# Patient Record
(demographics unavailable — no encounter records)

---

## 2025-02-05 NOTE — PHYSICAL EXAM
[Normal Breath Sounds] : Normal breath sounds [Normal Heart Sounds] : normal heart sounds [Alert] : alert [Calm] : calm [de-identified] :  in no acute distress. Well- developed, well-nourished. [de-identified] : normocephalic  [de-identified] : supple  [de-identified] : deferred [de-identified] : soft, non-distended, non-tender, no rebound or guarding.  [de-identified] : ?? +bulging, reducible hernia on right/ left/ mid abdomen/ inguinal region. [de-identified] : deferred [de-identified] : normal range of motions [de-identified] : warm, dry, intact.

## 2025-02-05 NOTE — PLAN
[FreeTextEntry1] : Right/left/bilateral groin/abdominal/incisional/umbilical hernia, symptomatic  - F/u standard pre-op labs and EKG  -Obtain PCP clearance  -Obtain CT A/P w & w/p contrast, needs disc  - Plan for robotic assisted, possible open, ___  inguinal /abdominal / ventral / incisional / umbilical hernia repair  - Go to ED if pain is resistant to pain medication or if symptoms worsen (N/V/fever, chills)

## 2025-02-05 NOTE — ASSESSMENT
[FreeTextEntry1] : This is a 54 y/o Female with a PMHx of ....... and PSHx of ........presents today for initial evaluation of ventral hernia.  Patient reports worsening of discomfort over the last.......weeks/month in abdomen exacerbated by coughing, straining, sneezing, lifting. Patient reports expansion of bulging/swelling that is reducible and associated with intermittent non-radiating pain. Relief with self-reduction/low activity/reclining. Denies nausea, vomiting, fever, chills, pain out of proportion, chest pain, HA, dizziness. Denies constipation, difficulty with bowel movements or urination. Denies shortness of breath, GROVE, or difficulty breathing, patient can walk / climb 2 flights without stopping. Denies hx of blood clots or bleeding problems.   Imaging: CT A/P  ( / / )w/ w/o contrast:   Exam significant for:  +bulging, reducible hernia on (?quadruant)  abdomen region.     The nature and risks of hernia were discussed as were signs and symptoms of incarceration, obstruction and strangulation as possible risks of observation. A thorough preoperative education has been performed about the role and the different surgical options have been discussed with patient. Risks, benefits and alternatives have been discussed and patient verbalizes understanding. Laparoscopic/Robotic/Open repair of inguinal hernia/abdominal hernia was discussed with the patient at length. The risks, benefits, and alternatives of the types of repair as well as to the use of mesh were discussed and all questions answered. Risks of hernia repair include, but are not limited to infection, bleeding, recurrence and injury to adjacent structures and nerves. Advised patient about possible risk of future complications if hernia worsens or goes untreated. Patient would like to proceed with RA ventral repair with mesh.

## 2025-02-05 NOTE — HISTORY OF PRESENT ILLNESS
[de-identified] : This is a 52 y/o Female with a PMHx of ....... and PSHx of ........presents today for initial evaluation of ventral hernia.  Patient reports worsening of discomfort over the last.......weeks/month in abdomen exacerbated by coughing, straining, sneezing, lifting. Patient reports expansion of bulging/swelling that is reducible and associated with intermittent non-radiating pain. Relief with self-reduction/low activity/reclining. Denies nausea, vomiting, fever, chills, pain out of proportion, chest pain, HA, dizziness. Denies constipation, difficulty with bowel movements or urination. Denies shortness of breath, GROVE, or difficulty breathing, patient can walk / climb 2 flights without stopping. Denies hx of blood clots or bleeding problems.   Imaging: CT A/P  ( / / )w/ w/o contrast:

## 2025-02-05 NOTE — ADDENDUM
[FreeTextEntry1] :  I, HE ONESIMO, am scribing for and in the presence of Dr. Downey for the following sections: HISTORY OF PRESENT ILLNESS, PAST MEDICAL HISTORY, PAST SURGICAL HISTORY, FAMILY/SOCIAL HISTORY, REVIEW OF SYSTEMS, VITAL SIGNS, PHYSICAL EXAM, DISPOSITION.Reviewed current treatment plan, including medications / surgical intervention / lifestyle modifications where indicated. Reviewed imaging, laboratory results, or other diagnostics as applicable. Addressed patient concerns, including potential complications, risk factors, or alternative treatment options as applicable. Provided detailed education on pre- or post-operative instructions,and expected outcome as applicable. Instructed pt to call office for questions or concerns. Instructed patient to schedule follow-up appointment as recommended. Referrals and testing ordered where indicated. Plan of care reviewed with patient, and questions answered. The total time spent with patient included more than 50% of the time dedicated to counseling and coordination of care.

## 2025-03-12 NOTE — ASSESSMENT
[FreeTextEntry1] : This is a 52 y/o Female with a PMHx of....... and PSHx of........presents today for initial evaluation of ventral hernia.  Patient reports worsening of discomfort over the last.......weeks/month in abdomen exacerbated by coughing, straining, sneezing, lifting. Patient reports expansion of bulging/swelling that is reducible and associated with intermittent non-radiating pain. Relief with self-reduction/low activity/reclining. Denies nausea, vomiting, fever, chills, pain out of proportion, chest pain, HA, dizziness. Denies constipation, difficulty with bowel movements or urination. Denies shortness of breath, GROVE, or difficulty breathing, patient can walk / climb 2 flights without stopping. Denies hx of blood clots or bleeding problems.  Imaging: CT A/P ( / / )w/ w/o contrast:  Exam significant for:  +bulging, reducible hernia on (?quadruant) abdomen region.   The nature and risks of hernia were discussed as were signs and symptoms of incarceration, obstruction and strangulation as possible risks of observation. A thorough preoperative education has been performed about the role and the different surgical options have been discussed with patient. Risks, benefits and alternatives have been discussed and patient verbalizes understanding. Laparoscopic/Robotic/Open repair of inguinal hernia/abdominal hernia was discussed with the patient at length. The risks, benefits, and alternatives of the types of repair as well as to the use of mesh were discussed and all questions answered. Risks of hernia repair include, but are not limited to infection, bleeding, recurrence and injury to adjacent structures and nerves. Advised patient about possible risk of future complications if hernia worsens or goes untreated. Patient would like to proceed with RA ventral repair with mesh.

## 2025-03-12 NOTE — PLAN
[FreeTextEntry1] : Right/left/bilateral groin/abdominal/incisional/umbilical hernia, symptomatic  - F/u standard pre-op labs and EKG  -Obtain PCP clearance  -Obtain CT A/P w & w/p contrast, needs disc  - Plan for robotic assisted, possible open, ___ inguinal /abdominal / ventral / incisional / umbilical hernia repair  - Go to ED if pain is resistant to pain medication or if symptoms worsen (N/V/fever, chills).

## 2025-03-12 NOTE — PHYSICAL EXAM
[Normal Breath Sounds] : Normal breath sounds [Normal Heart Sounds] : normal heart sounds [Alert] : alert [Calm] : calm [de-identified] :  in no acute distress. Well- developed, well-nourished. [de-identified] : normocephalic  [de-identified] : supple  [de-identified] : deferred [de-identified] : soft, non-distended, non-tender, no rebound or guarding.  [de-identified] : ?? +bulging, reducible hernia on right/ left/ mid abdomen/ inguinal region. [de-identified] : deferred [de-identified] : normal range of motions [de-identified] : warm, dry, intact.

## 2025-03-19 NOTE — ASSESSMENT
[FreeTextEntry1] : This is a 52 y/o Female presents today for 3 months f/u s/p robotic eTEP primary ventral hernia repair on 11/14/24 under [facility].   Pt with PMHx of GERD, HLD  PSHx of lap band (2008), lap band removal (2018), abdominoplasty (2008), C/S (2002 and 2005), uterine ablation (2007).  Post-operatively, the patient is doing well. Pain is well controlled and has been improving. Endorses mild abdominal discomfort with standing and moving, does not require any pain meds. Patient is tolerating regular diet w/o nausea or vomiting. Denies any difficulty with bowel movements/constipation or urinating. Offers no other acute complaints at this time. Denies nausea, vomiting, chest pain, SOB, GROVE, calf pain, HA, dizziness, fever or chills since surgery.  Assessment -Exam shows incisions are healing well. No tenderness. No evidence of recurrent hernia or seroma. - No tenderness, rebound or guarding - No signs of infection.

## 2025-03-19 NOTE — PHYSICAL EXAM
[Normal Breath Sounds] : Normal breath sounds [Normal Heart Sounds] : normal heart sounds [Alert] : alert [Calm] : calm [de-identified] :  in no acute distress. Well- developed, well-nourished. [de-identified] : normocephalic  [de-identified] : supple  [de-identified] : deferred [de-identified] : soft, non-distended, non-tender, no rebound or guarding. Incisions - healing well, clean dry intact, no evidence of drainage, purulence, surrounding erythema/ seroma/ hematoma/ bleeding. No recurrent of hernia with valsalva changes. [de-identified] : deferred [de-identified] : deferred [de-identified] : normal range of motions [de-identified] : warm, dry, intact.

## 2025-03-19 NOTE — HISTORY OF PRESENT ILLNESS
[de-identified] : This is a 54 y/o Female presents today for 3 months f/u s/p robotic eTEP primary ventral hernia repair on 11/14/24 under [facility].   Pt with PMHx of GERD, HLD  PSHx of lap band (2008), lap band removal (2018), abdominoplasty (2008), C/S (2002 and 2005), uterine ablation (2007).  Post-operatively, the patient is doing well. Pain is well controlled and has been improving. Endorses mild abdominal discomfort with standing and moving, does not require any pain meds. Patient is tolerating regular diet w/o nausea or vomiting. Denies any difficulty with bowel movements/constipation or urinating. Offers no other acute complaints at this time. Denies nausea, vomiting, chest pain, SOB, GROVE, calf pain, HA, dizziness, fever or chills since surgery.

## 2025-03-19 NOTE — PHYSICAL EXAM
[Normal Breath Sounds] : Normal breath sounds [Normal Heart Sounds] : normal heart sounds [Alert] : alert [Calm] : calm [de-identified] :  in no acute distress. Well- developed, well-nourished. [de-identified] : normocephalic  [de-identified] : supple  [de-identified] : deferred [de-identified] : soft, non-distended, non-tender, no rebound or guarding. Incisions - healing well, clean dry intact, no evidence of drainage, purulence, surrounding erythema/ seroma/ hematoma/ bleeding. No recurrent of hernia with valsalva changes. [de-identified] : deferred [de-identified] : deferred [de-identified] : normal range of motions [de-identified] : warm, dry, intact.

## 2025-03-19 NOTE — PLAN
[FreeTextEntry1] : S/p robotic assisted primary ventral hernia repair on 11/24: - On post-op check patient is doing well and incisions are healing properly. No signs of post-operative complications at this time. - No recurrences of ventral hernia.  - Patient can continue to follow up outpatient in 1 year should they have any worsening of symptoms, pain, or complications

## 2025-03-19 NOTE — REASON FOR VISIT
[Initial Evaluation] : an initial evaluation [FreeTextEntry1] : s/p robotic primary ventral hernias on 11/24

## 2025-03-19 NOTE — PHYSICAL EXAM
[Normal Breath Sounds] : Normal breath sounds [Normal Heart Sounds] : normal heart sounds [Alert] : alert [Calm] : calm [de-identified] :  in no acute distress. Well- developed, well-nourished. [de-identified] : normocephalic  [de-identified] : supple  [de-identified] : deferred [de-identified] : soft, non-distended, non-tender, no rebound or guarding. Incisions - healing well, clean dry intact, no evidence of drainage, purulence, surrounding erythema/ seroma/ hematoma/ bleeding. No recurrent of hernia with valsalva changes. [de-identified] : deferred [de-identified] : deferred [de-identified] : normal range of motions [de-identified] : warm, dry, intact.

## 2025-03-19 NOTE — ASSESSMENT
[FreeTextEntry1] : This is a 54 y/o Female presents today for 3 months f/u s/p robotic eTEP primary ventral hernia repair on 11/14/24 under [facility].   Pt with PMHx of GERD, HLD  PSHx of lap band (2008), lap band removal (2018), abdominoplasty (2008), C/S (2002 and 2005), uterine ablation (2007).  Post-operatively, the patient is doing well. Pain is well controlled and has been improving. Endorses mild abdominal discomfort with standing and moving, does not require any pain meds. Patient is tolerating regular diet w/o nausea or vomiting. Denies any difficulty with bowel movements/constipation or urinating. Offers no other acute complaints at this time. Denies nausea, vomiting, chest pain, SOB, GROVE, calf pain, HA, dizziness, fever or chills since surgery.  Assessment -Exam shows incisions are healing well. No tenderness. No evidence of recurrent hernia or seroma. - No tenderness, rebound or guarding - No signs of infection.

## 2025-03-19 NOTE — HISTORY OF PRESENT ILLNESS
[de-identified] : This is a 54 y/o Female presents today for 3 months f/u s/p robotic eTEP primary ventral hernia repair on 11/14/24 under [facility].   Pt with PMHx of GERD, HLD  PSHx of lap band (2008), lap band removal (2018), abdominoplasty (2008), C/S (2002 and 2005), uterine ablation (2007).  Post-operatively, the patient is doing well. Pain is well controlled and has been improving. Endorses mild abdominal discomfort with standing and moving, does not require any pain meds. Patient is tolerating regular diet w/o nausea or vomiting. Denies any difficulty with bowel movements/constipation or urinating. Offers no other acute complaints at this time. Denies nausea, vomiting, chest pain, SOB, GROVE, calf pain, HA, dizziness, fever or chills since surgery.

## 2025-03-19 NOTE — HISTORY OF PRESENT ILLNESS
[de-identified] : This is a 52 y/o Female presents today for 3 months f/u s/p robotic eTEP primary ventral hernia repair on 11/14/24 under [facility].   Pt with PMHx of GERD, HLD  PSHx of lap band (2008), lap band removal (2018), abdominoplasty (2008), C/S (2002 and 2005), uterine ablation (2007).  Post-operatively, the patient is doing well. Pain is well controlled and has been improving. Endorses mild abdominal discomfort with standing and moving, does not require any pain meds. Patient is tolerating regular diet w/o nausea or vomiting. Denies any difficulty with bowel movements/constipation or urinating. Offers no other acute complaints at this time. Denies nausea, vomiting, chest pain, SOB, GROVE, calf pain, HA, dizziness, fever or chills since surgery.